# Patient Record
Sex: FEMALE
[De-identification: names, ages, dates, MRNs, and addresses within clinical notes are randomized per-mention and may not be internally consistent; named-entity substitution may affect disease eponyms.]

---

## 2020-10-07 ENCOUNTER — NURSE TRIAGE (OUTPATIENT)
Dept: OTHER | Facility: CLINIC | Age: 65
End: 2020-10-07

## 2023-11-07 ENCOUNTER — HOSPITAL ENCOUNTER (OUTPATIENT)
Dept: CT IMAGING | Age: 68
Discharge: HOME | End: 2023-11-07
Payer: COMMERCIAL

## 2023-11-07 ENCOUNTER — HOSPITAL ENCOUNTER (OUTPATIENT)
Dept: GENERAL RADIOLOGY | Age: 68
Discharge: HOME | End: 2023-11-07
Payer: COMMERCIAL

## 2023-11-07 DIAGNOSIS — R91.8 LUNG MASS: ICD-10-CM

## 2023-11-07 DIAGNOSIS — M85.80 MELORHEOSTOSIS: ICD-10-CM

## 2023-11-07 PROCEDURE — 77080 DXA BONE DENSITY AXIAL: CPT

## 2023-11-07 PROCEDURE — 71250 CT THORAX DX C-: CPT

## 2024-12-17 ENCOUNTER — OFFICE VISIT (OUTPATIENT)
Dept: ORTHOPEDIC SURGERY | Age: 69
End: 2024-12-17
Payer: COMMERCIAL

## 2024-12-17 VITALS — BODY MASS INDEX: 47.09 KG/M2 | WEIGHT: 293 LBS | HEIGHT: 66 IN

## 2024-12-17 DIAGNOSIS — E66.9 OBESITY, UNSPECIFIED CLASS, UNSPECIFIED OBESITY TYPE, UNSPECIFIED WHETHER SERIOUS COMORBIDITY PRESENT: ICD-10-CM

## 2024-12-17 DIAGNOSIS — M17.11 PRIMARY OSTEOARTHRITIS OF RIGHT KNEE: Primary | ICD-10-CM

## 2024-12-17 DIAGNOSIS — M25.561 RIGHT KNEE PAIN, UNSPECIFIED CHRONICITY: ICD-10-CM

## 2024-12-17 PROCEDURE — 1123F ACP DISCUSS/DSCN MKR DOCD: CPT | Performed by: INTERNAL MEDICINE

## 2024-12-17 PROCEDURE — 99204 OFFICE O/P NEW MOD 45 MIN: CPT | Performed by: INTERNAL MEDICINE

## 2024-12-17 PROCEDURE — 1125F AMNT PAIN NOTED PAIN PRSNT: CPT | Performed by: INTERNAL MEDICINE

## 2024-12-17 NOTE — PROGRESS NOTES
Chief Complaint:   Chief Complaint   Patient presents with    Knee Pain     right, pain at PF joint, NKI, pain started 4 yrs ago and has been on/off ever since, has had CSI (not much help) and HA inj (helpful) at [place], but did not like the doctor there, difficulty with standing/walking, donning socks/shoes          History of Present Illness:       Patient is a 69 y.o. female presents with the above complaint. The symptoms began 2 yearsago and started without an injury. The patient describes a aching pain that does not radiate.  The symptoms are constant  and are are improving since the onset.       Pain localizes to the medial side of the knee and  does not seems to follow a typical patella femoral provacative pattern.  There are not mechanical symptoms that suggest meniscal injury.  The patient admits to subjective instability about the knee and denies new onset weakness of the lower extremity.    Pain level 6    The patient denies a pattern of activity related swelling.      Treatment to date: Other injection HA with good improvement.     There is no prior history of knee trauma. Workup has included  none    There is no prior history of autoimmune disease, inflammatory arthropathy, or crystal arthropathy.         Past Medical History:      No past medical history on file.    No past surgical history on file.      Present Medications:         No current outpatient medications on file.     No current facility-administered medications for this visit.         Allergies:        Allergies   Allergen Reactions    Hydrocodone     Hydrocodone-Acetaminophen Itching    Penicillins Hives and Itching        Social History:         Social History     Socioeconomic History    Marital status: Single     Spouse name: Not on file    Number of children: Not on file    Years of education: Not on file    Highest education level: Not on file   Occupational History    Not on file   Tobacco Use    Smoking status: Not on file

## 2024-12-24 ENCOUNTER — OFFICE VISIT (OUTPATIENT)
Dept: ORTHOPEDIC SURGERY | Age: 69
End: 2024-12-24

## 2024-12-24 VITALS — HEIGHT: 66 IN | WEIGHT: 293 LBS | BODY MASS INDEX: 47.09 KG/M2

## 2024-12-24 DIAGNOSIS — E66.9 OBESITY, UNSPECIFIED CLASS, UNSPECIFIED OBESITY TYPE, UNSPECIFIED WHETHER SERIOUS COMORBIDITY PRESENT: ICD-10-CM

## 2024-12-24 DIAGNOSIS — M17.11 PRIMARY OSTEOARTHRITIS OF RIGHT KNEE: Primary | ICD-10-CM

## 2024-12-24 RX ORDER — LIDOCAINE HYDROCHLORIDE 10 MG/ML
5 INJECTION, SOLUTION INFILTRATION; PERINEURAL ONCE
Status: COMPLETED | OUTPATIENT
Start: 2024-12-24 | End: 2024-12-24

## 2024-12-24 RX ADMIN — LIDOCAINE HYDROCHLORIDE 5 ML: 10 INJECTION, SOLUTION INFILTRATION; PERINEURAL at 11:12

## 2024-12-26 NOTE — PROGRESS NOTES
Chief Complaint:   Chief Complaint   Patient presents with    Knee Pain     Right Knee - She has not done any PT. Knee does not have any swelling. Pain is still the same.          History of Present Illness:       Patient is a 69 y.o. female returns follow up for the above complaint. The patient was last seen approximately 1 weeksago. The symptoms show no change since the last visit. The patient has had no further testing for the problem.    She would like to proceed with HA therapy as discussed    Pain levels 3/10     Past Medical History:      No past medical history on file.     Present Medications:         No current outpatient medications on file.     No current facility-administered medications for this visit.         Allergies:        Allergies   Allergen Reactions    Hydrocodone     Hydrocodone-Acetaminophen Itching    Penicillins Hives and Itching           Review of Systems:    Pertinent items are noted in HPI     Vital Signs:    There were no vitals filed for this visit.     General Exam:     Constitutional: Patient is adequately groomed with no evidence of malnutrition    Physical Exam: right knee     General: Obese body habitus   Primary Exam:    Inspection: No appreciable effusion      Skin: There are no rashes, ulcerations or lesions.      Gait: Nonantalgic      Neurovascular - non focal and intact       Additional Comments:        Additional Examinations:                Office Imaging Results/Procedures PerformedToday:           Office Procedures:     Orders Placed This Encounter   Procedures    US ARTHR/ASP/INJ MAJOR JNT/BURSA RIGHT     Standing Status:   Future     Number of Occurrences:   1     Standing Expiration Date:   12/24/2025     Order Specific Question:   Arthrocentesis/Joint Injection/Aspiration Location     Answer:   R knee     Order Specific Question:   Please choose procedure type:     Answer:   Injection     Order Specific Question:   Reason for exam:     Answer:   HA inj     The 
Statement Selected